# Patient Record
Sex: FEMALE | Race: WHITE
[De-identification: names, ages, dates, MRNs, and addresses within clinical notes are randomized per-mention and may not be internally consistent; named-entity substitution may affect disease eponyms.]

---

## 2021-10-08 PROBLEM — Z00.00 ENCOUNTER FOR PREVENTIVE HEALTH EXAMINATION: Status: ACTIVE | Noted: 2021-10-08

## 2021-10-15 ENCOUNTER — APPOINTMENT (OUTPATIENT)
Dept: ORTHOPEDIC SURGERY | Facility: CLINIC | Age: 66
End: 2021-10-15
Payer: MEDICARE

## 2021-10-15 ENCOUNTER — NON-APPOINTMENT (OUTPATIENT)
Age: 66
End: 2021-10-15

## 2021-10-15 VITALS — WEIGHT: 138 LBS | HEIGHT: 65 IN | BODY MASS INDEX: 22.99 KG/M2

## 2021-10-15 PROCEDURE — 99204 OFFICE O/P NEW MOD 45 MIN: CPT

## 2021-10-15 NOTE — DISCUSSION/SUMMARY
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 At this time, she has deferred repeat xrays today as she stated she is not interested in surgery. We did discuss treatment options of surgical versus nonsurgical management. I told her that hand therapy will not improve the deformity and positioning of her wrist but she may continue with hand therapy as she deems necessary if she believes that it is helping.  She and her  asked multiple questions about surgery but she has multiple concerns.  They told me that she could not consider surgery until after January.  I told her and her  that if she is considering surgery at that time, which I would recommend, then she should return to the office if she would like to discuss this further.\par \par She has agreed to the above plan of management and has expressed full understanding.  All questions were fully answered to their satisfaction. \par \par My cumulative time spent on this visit included: Preparation for the visit, review of the medical records, review of pertinent diagnostic studies, examination and counseling of the patient on the above diagnosis, treatment plan and prognosis, orders of diagnostic tests, medication and/or appropriate procedures and documentation in the medical records of today's visit.

## 2021-10-15 NOTE — END OF VISIT
[FreeTextEntry3] : This note was written by Pippa Salazar on 10/15/2021 acting solely as a scribe for Dr. Edvin Vides.\par  \par All medical record entries made by the Scribe were at my, Dr. Edvin Vides, direction and personally dictated by me on 10/15/2021. I have personally reviewed the chart and agree that the record accurately reflects my personal performance of the history, physical exam, assessment and plan.

## 2021-10-15 NOTE — ADDENDUM
[FreeTextEntry1] : I, Pippa Salazar, acted solely as a scribe for Dr. Vides on this date on 10/15/2021.

## 2021-10-15 NOTE — PHYSICAL EXAM
[de-identified] : - Constitutional: This is a female in no obvious distress.  \par - Psych: Patient is alert and oriented to person, place and time.  Patient has a normal mood and affect.\par - Cardiovascular: Normal pulses throughout the upper extremities.  No significant varicosities are noted in the upper extremities. \par - Neuro: Strength and sensation are intact throughout the upper extremities.  Patient has normal coordination.\par - Respiratory:  Patient exhibits no evidence of shortness of breath or difficulty breathing.\par - Skin: No rashes, lesions, or other abnormalities are noted in the upper extremities.\par \par --- \par \par Examination of her left wrist and hand demonstrates an obvious deformity consistent with a distal radius malunion.  There is a significant silver fork deformity.  She has approximately 10 degrees of wrist flexion 40 degrees of extension.  She has limitation of pronation supination.  She can nearly flex the digits into the palm.  She is neurovascularly intact distally. [de-identified] : I reviewed radiographs of her left wrist from 7/30/2021 which demonstrate a distal radius malunion with significant shortening and 55 degrees of dorsal angulation on the lateral consistent with a severe malunion.  She refused further x-rays today.

## 2021-10-15 NOTE — HISTORY OF PRESENT ILLNESS
[Right] : right hand dominant [FreeTextEntry1] : She comes in today for evaluation of a left hand injury which occurred on 6/18/21 at which time she fell at home. She was previously treated at [facility] by Dr. Flor. She was treated in a cast on 6/21/21 and advised she would need to follow up weekly for repeat xrays. She refused surgery and was unhappy with her care. She states she was in PT for two months. She presents today for another opinion with her most recent xray having been taken on 7/30/211/ She complains of persistent swelling but denies pain at this time. She is quite adamant about refusing surgery and complains of limitations of motion.\par \par \par She previously fractured her wrist in 2009 \par \par

## 2022-04-05 ENCOUNTER — APPOINTMENT (OUTPATIENT)
Dept: ORTHOPEDIC SURGERY | Facility: CLINIC | Age: 67
End: 2022-04-05
Payer: MEDICARE

## 2022-04-05 VITALS — HEIGHT: 65 IN | BODY MASS INDEX: 22.99 KG/M2 | WEIGHT: 138 LBS

## 2022-04-05 DIAGNOSIS — Z78.9 OTHER SPECIFIED HEALTH STATUS: ICD-10-CM

## 2022-04-05 DIAGNOSIS — M19.012 PRIMARY OSTEOARTHRITIS, LEFT SHOULDER: ICD-10-CM

## 2022-04-05 DIAGNOSIS — S52.502P UNSPECIFIED FRACTURE OF THE LOWER END OF LEFT RADIUS, SUBSEQUENT ENCOUNTER FOR CLOSED FRACTURE WITH MALUNION: ICD-10-CM

## 2022-04-05 DIAGNOSIS — S42.202D UNSPECIFIED FRACTURE OF UPPER END OF LEFT HUMERUS, SUBSEQUENT ENCOUNTER FOR FRACTURE WITH ROUTINE HEALING: ICD-10-CM

## 2022-04-05 PROCEDURE — 99213 OFFICE O/P EST LOW 20 MIN: CPT

## 2022-04-05 NOTE — PHYSICAL EXAM
[] : tenderness at lateral shoulder [Left] : left hand [FreeTextEntry9] : FE: R 150, L 130\par ER: L 50\par IR: L1/2 [FreeTextEntry3] : Wrist deformity [TWNoteComboBox7] : dorsiflexion 40 degrees [TWNoteComboBox4] : volarflexion 30 degrees

## 2022-04-05 NOTE — ASSESSMENT
[FreeTextEntry1] : L GH DJD.\par MRI reviewed- L PHFx, PRCT.\par She has stiffness. \par She has been in PT with improvement.\par HEP.\par RTO prn.

## 2022-04-05 NOTE — HISTORY OF PRESENT ILLNESS
[5] : 5 [Throbbing] : throbbing [Part time] : Work status: part time [de-identified] : 4/5/22: Here for follow up.  She is in PT with improvement.  she has some stiffness but has improved.  She can do most activities. \par \par 2/8/22: Here for follow up. She was in PT with improvement. She has some stiffness.\par \par 12/14/21: Here for follow up, MRI review. She has been in Pt with improvement. Her ROM is improving. \par \par MRI L shoulder:\par 1. Transverse nondisplaced fracture of the surgical neck of the humerus.\par 2. Rotator cuff tendinopathy and fraying with low-grade articular tear at anterior insertion of the supraspinatus 5 mm x 5 mm.\par 3. Tear of the superior labrum and anterior inferior labrum. Biceps tendinopathy and tenosynovitis.\par 4. Capsular thickening, which can be seen with adhesive capsulitis.\par 5. AC joint arthrosis.\par \par 10/26/21: 67 y/o RHD female referred here today for the L shoulder. She saw Dr. Lopez for a wrist fracture in June. She had a trip and fall at home last week onto the left shoulder. She has bruising in the upper arm. She feels pain posterior and in the upper arm. She had xrays from urgent care. She has had no treatment. [FreeTextEntry1] : L shoulder [de-identified] : PT